# Patient Record
Sex: FEMALE | Race: WHITE | NOT HISPANIC OR LATINO | Employment: OTHER | URBAN - METROPOLITAN AREA
[De-identification: names, ages, dates, MRNs, and addresses within clinical notes are randomized per-mention and may not be internally consistent; named-entity substitution may affect disease eponyms.]

---

## 2023-09-06 LAB
ALANINE AMINOTRANSFERASE (SGPT) (U/L) IN SER/PLAS: 12 U/L (ref 7–45)
ALBUMIN (G/DL) IN SER/PLAS: 4.1 G/DL (ref 3.4–5)
ALKALINE PHOSPHATASE (U/L) IN SER/PLAS: 38 U/L (ref 33–136)
ANION GAP IN SER/PLAS: 13 MMOL/L (ref 10–20)
APPEARANCE, URINE: NORMAL
ASPARTATE AMINOTRANSFERASE (SGOT) (U/L) IN SER/PLAS: 21 U/L (ref 9–39)
BASOPHILS (10*3/UL) IN BLOOD BY AUTOMATED COUNT: 0.08 X10E9/L (ref 0–0.1)
BASOPHILS/100 LEUKOCYTES IN BLOOD BY AUTOMATED COUNT: 1.8 % (ref 0–2)
BILIRUBIN TOTAL (MG/DL) IN SER/PLAS: 0.6 MG/DL (ref 0–1.2)
BILIRUBIN, URINE: NEGATIVE
BLOOD, URINE: NEGATIVE
CALCIDIOL (25 OH VITAMIN D3) (NG/ML) IN SER/PLAS: 28 NG/ML
CALCIUM (MG/DL) IN SER/PLAS: 9 MG/DL (ref 8.6–10.3)
CARBON DIOXIDE, TOTAL (MMOL/L) IN SER/PLAS: 29 MMOL/L (ref 21–32)
CHLORIDE (MMOL/L) IN SER/PLAS: 90 MMOL/L (ref 98–107)
CHOLESTEROL (MG/DL) IN SER/PLAS: 158 MG/DL (ref 0–199)
CHOLESTEROL IN HDL (MG/DL) IN SER/PLAS: 90.2 MG/DL
CHOLESTEROL/HDL RATIO: 1.8
COLOR, URINE: YELLOW
CREATININE (MG/DL) IN SER/PLAS: 0.73 MG/DL (ref 0.5–1.05)
EOSINOPHILS (10*3/UL) IN BLOOD BY AUTOMATED COUNT: 0.08 X10E9/L (ref 0–0.4)
EOSINOPHILS/100 LEUKOCYTES IN BLOOD BY AUTOMATED COUNT: 1.8 % (ref 0–6)
ERYTHROCYTE DISTRIBUTION WIDTH (RATIO) BY AUTOMATED COUNT: 13 % (ref 11.5–14.5)
ERYTHROCYTE MEAN CORPUSCULAR HEMOGLOBIN CONCENTRATION (G/DL) BY AUTOMATED: 32.2 G/DL (ref 32–36)
ERYTHROCYTE MEAN CORPUSCULAR VOLUME (FL) BY AUTOMATED COUNT: 95 FL (ref 80–100)
ERYTHROCYTES (10*6/UL) IN BLOOD BY AUTOMATED COUNT: 3.76 X10E12/L (ref 4–5.2)
GFR FEMALE: 81 ML/MIN/1.73M2
GLUCOSE (MG/DL) IN SER/PLAS: 84 MG/DL (ref 74–99)
GLUCOSE, URINE: NEGATIVE MG/DL
HEMATOCRIT (%) IN BLOOD BY AUTOMATED COUNT: 35.7 % (ref 36–46)
HEMOGLOBIN (G/DL) IN BLOOD: 11.5 G/DL (ref 12–16)
IMMATURE GRANULOCYTES/100 LEUKOCYTES IN BLOOD BY AUTOMATED COUNT: 0.2 % (ref 0–0.9)
KETONES, URINE: NEGATIVE MG/DL
LDL: 58 MG/DL (ref 0–99)
LEUKOCYTE ESTERASE, URINE: NEGATIVE
LEUKOCYTES (10*3/UL) IN BLOOD BY AUTOMATED COUNT: 4.4 X10E9/L (ref 4.4–11.3)
LYMPHOCYTES (10*3/UL) IN BLOOD BY AUTOMATED COUNT: 0.89 X10E9/L (ref 0.8–3)
LYMPHOCYTES/100 LEUKOCYTES IN BLOOD BY AUTOMATED COUNT: 20.2 % (ref 13–44)
MONOCYTES (10*3/UL) IN BLOOD BY AUTOMATED COUNT: 0.41 X10E9/L (ref 0.05–0.8)
MONOCYTES/100 LEUKOCYTES IN BLOOD BY AUTOMATED COUNT: 9.3 % (ref 2–10)
NEUTROPHILS (10*3/UL) IN BLOOD BY AUTOMATED COUNT: 2.94 X10E9/L (ref 1.6–5.5)
NEUTROPHILS/100 LEUKOCYTES IN BLOOD BY AUTOMATED COUNT: 66.7 % (ref 40–80)
NITRITE, URINE: NEGATIVE
PH, URINE: 5 (ref 5–8)
PLATELETS (10*3/UL) IN BLOOD AUTOMATED COUNT: 268 X10E9/L (ref 150–450)
POTASSIUM (MMOL/L) IN SER/PLAS: 4 MMOL/L (ref 3.5–5.3)
PROTEIN TOTAL: 6.3 G/DL (ref 6.4–8.2)
PROTEIN, URINE: NEGATIVE MG/DL
SODIUM (MMOL/L) IN SER/PLAS: 128 MMOL/L (ref 136–145)
SPECIFIC GRAVITY, URINE: 1.01 (ref 1–1.03)
THYROTROPIN (MIU/L) IN SER/PLAS BY DETECTION LIMIT <= 0.05 MIU/L: 2.24 MIU/L (ref 0.44–3.98)
TRIGLYCERIDE (MG/DL) IN SER/PLAS: 48 MG/DL (ref 0–149)
UREA NITROGEN (MG/DL) IN SER/PLAS: 16 MG/DL (ref 6–23)
UROBILINOGEN, URINE: <2 MG/DL (ref 0–1.9)
VLDL: 10 MG/DL (ref 0–40)

## 2023-10-31 PROBLEM — R00.1 BRADYCARDIA: Status: ACTIVE | Noted: 2023-10-31

## 2023-10-31 PROBLEM — Z85.3 PERSONAL HISTORY OF MALIGNANT NEOPLASM OF BREAST: Status: ACTIVE | Noted: 2022-02-14

## 2023-10-31 PROBLEM — I10 ESSENTIAL HYPERTENSION: Status: ACTIVE | Noted: 2022-02-14

## 2023-10-31 PROBLEM — K64.8 INTERNAL HEMORRHOIDS: Status: ACTIVE | Noted: 2023-10-31

## 2023-10-31 PROBLEM — H44.519 ABSOLUTE GLAUCOMA: Status: ACTIVE | Noted: 2022-02-14

## 2023-10-31 PROBLEM — I27.29 OTHER SECONDARY PULMONARY HYPERTENSION (MULTI): Status: ACTIVE | Noted: 2023-10-31

## 2023-10-31 PROBLEM — I35.0 NONRHEUMATIC AORTIC (VALVE) STENOSIS: Status: ACTIVE | Noted: 2023-10-31

## 2023-10-31 PROBLEM — K59.09 CONSTIPATION, CHRONIC: Status: ACTIVE | Noted: 2023-10-31

## 2023-10-31 PROBLEM — K92.1 HEMATOCHEZIA: Status: ACTIVE | Noted: 2023-10-31

## 2023-10-31 PROBLEM — J20.9 BRONCHITIS, ACUTE: Status: ACTIVE | Noted: 2023-10-31

## 2023-10-31 PROBLEM — I48.19 PERSISTENT ATRIAL FIBRILLATION (MULTI): Status: ACTIVE | Noted: 2019-10-30

## 2023-10-31 PROBLEM — K92.1 MELENA: Status: ACTIVE | Noted: 2022-02-14

## 2023-10-31 PROBLEM — I35.9 AORTIC VALVE DISORDER: Status: ACTIVE | Noted: 2022-02-14

## 2023-10-31 PROBLEM — R60.9 EDEMA: Status: ACTIVE | Noted: 2023-10-31

## 2023-10-31 PROBLEM — N39.0 URINARY TRACT INFECTIOUS DISEASE: Status: ACTIVE | Noted: 2022-02-14

## 2023-10-31 PROBLEM — K92.1 BLOOD IN STOOL: Status: ACTIVE | Noted: 2023-10-31

## 2023-10-31 PROBLEM — Z95.2 S/P TAVR (TRANSCATHETER AORTIC VALVE REPLACEMENT): Status: ACTIVE | Noted: 2023-10-31

## 2023-10-31 RX ORDER — AMOXICILLIN AND CLAVULANATE POTASSIUM 875; 125 MG/1; MG/1
875 TABLET, FILM COATED ORAL 2 TIMES DAILY
COMMUNITY
Start: 2022-04-04 | End: 2024-05-28 | Stop reason: SDUPTHER

## 2023-10-31 RX ORDER — MULTIVIT-MIN/IRON/FOLIC ACID/K 18-600-40
500 CAPSULE ORAL DAILY
COMMUNITY
Start: 2021-07-19

## 2023-10-31 RX ORDER — UBIDECARENONE 75 MG
CAPSULE ORAL
COMMUNITY
Start: 2021-07-19

## 2023-10-31 RX ORDER — TAMOXIFEN CITRATE 20 MG/1
TABLET ORAL
COMMUNITY
Start: 2021-07-19

## 2023-10-31 RX ORDER — HYDROCORTISONE ACETATE 25 MG/1
SUPPOSITORY RECTAL
COMMUNITY
Start: 2023-06-15

## 2023-10-31 RX ORDER — GLUCOSAM/CHONDRO/HERB 149/HYAL 750-100 MG
TABLET ORAL
COMMUNITY

## 2023-10-31 RX ORDER — GLUCOSAMINE HCL 500 MG
1 TABLET ORAL DAILY
COMMUNITY

## 2023-10-31 RX ORDER — ERGOCALCIFEROL 1.25 MG/1
CAPSULE ORAL
COMMUNITY

## 2023-10-31 RX ORDER — DOXYCYCLINE 100 MG/1
CAPSULE ORAL
COMMUNITY
Start: 2023-05-23 | End: 2024-06-03 | Stop reason: SDUPTHER

## 2023-10-31 RX ORDER — ACETAMINOPHEN 500 MG
TABLET ORAL
COMMUNITY

## 2023-10-31 RX ORDER — BENZONATATE 200 MG/1
200 CAPSULE ORAL 3 TIMES DAILY PRN
COMMUNITY
Start: 2022-04-04

## 2023-10-31 RX ORDER — VITAMIN E MIXED 400 UNIT
CAPSULE ORAL
COMMUNITY
Start: 2011-02-16

## 2023-10-31 RX ORDER — VITAMIN E 268 MG
CAPSULE ORAL
COMMUNITY
Start: 2021-07-19

## 2023-10-31 RX ORDER — LATANOPROST 50 UG/ML
SOLUTION/ DROPS OPHTHALMIC
COMMUNITY
Start: 2021-07-19

## 2023-10-31 RX ORDER — FUROSEMIDE 20 MG/1
TABLET ORAL
COMMUNITY
Start: 2023-08-24

## 2023-10-31 RX ORDER — POTASSIUM CHLORIDE 1500 MG/1
TABLET, EXTENDED RELEASE ORAL
COMMUNITY
Start: 2023-08-24

## 2023-10-31 RX ORDER — ALBUTEROL SULFATE 90 UG/1
AEROSOL, METERED RESPIRATORY (INHALATION)
COMMUNITY
Start: 2021-07-19

## 2023-10-31 RX ORDER — FUROSEMIDE 40 MG/1
1 TABLET ORAL DAILY
COMMUNITY
Start: 2023-09-14 | End: 2024-09-12

## 2023-10-31 RX ORDER — ASPIRIN 81 MG/1
81 TABLET ORAL DAILY
COMMUNITY

## 2023-10-31 RX ORDER — ALBUTEROL SULFATE 90 UG/1
AEROSOL, METERED RESPIRATORY (INHALATION)
COMMUNITY
Start: 2022-10-21

## 2023-10-31 RX ORDER — METOLAZONE 2.5 MG/1
2.5 TABLET ORAL
COMMUNITY
Start: 2023-08-24

## 2023-10-31 RX ORDER — QUININE SULFATE 324 MG/1
324 CAPSULE ORAL NIGHTLY
COMMUNITY
Start: 2019-04-22

## 2023-11-07 DIAGNOSIS — C50.912 MALIGNANT NEOPLASM OF LEFT FEMALE BREAST, UNSPECIFIED ESTROGEN RECEPTOR STATUS, UNSPECIFIED SITE OF BREAST (MULTI): ICD-10-CM

## 2023-11-14 ENCOUNTER — OFFICE VISIT (OUTPATIENT)
Dept: HEMATOLOGY/ONCOLOGY | Facility: CLINIC | Age: 83
End: 2023-11-14
Payer: MEDICARE

## 2023-11-14 ENCOUNTER — APPOINTMENT (OUTPATIENT)
Dept: LAB | Facility: HOSPITAL | Age: 83
End: 2023-11-14
Payer: MEDICARE

## 2023-11-14 VITALS
WEIGHT: 159.28 LBS | DIASTOLIC BLOOD PRESSURE: 54 MMHG | OXYGEN SATURATION: 95 % | HEIGHT: 63 IN | HEART RATE: 119 BPM | BODY MASS INDEX: 28.22 KG/M2 | RESPIRATION RATE: 16 BRPM | SYSTOLIC BLOOD PRESSURE: 116 MMHG | TEMPERATURE: 97.9 F

## 2023-11-14 DIAGNOSIS — C50.912 MALIGNANT NEOPLASM OF LEFT FEMALE BREAST, UNSPECIFIED ESTROGEN RECEPTOR STATUS, UNSPECIFIED SITE OF BREAST (MULTI): ICD-10-CM

## 2023-11-14 LAB
ALBUMIN SERPL BCP-MCNC: 4.1 G/DL (ref 3.4–5)
ALP SERPL-CCNC: 41 U/L (ref 33–136)
ALT SERPL W P-5'-P-CCNC: 12 U/L (ref 7–45)
ANION GAP SERPL CALC-SCNC: 11 MMOL/L (ref 10–20)
AST SERPL W P-5'-P-CCNC: 21 U/L (ref 9–39)
BASOPHILS # BLD AUTO: 0.08 X10*3/UL (ref 0–0.1)
BASOPHILS NFR BLD AUTO: 1.3 %
BILIRUB SERPL-MCNC: 0.5 MG/DL (ref 0–1.2)
BUN SERPL-MCNC: 27 MG/DL (ref 6–23)
CALCIUM SERPL-MCNC: 9 MG/DL (ref 8.6–10.3)
CHLORIDE SERPL-SCNC: 100 MMOL/L (ref 98–107)
CO2 SERPL-SCNC: 30 MMOL/L (ref 21–32)
CREAT SERPL-MCNC: 0.98 MG/DL (ref 0.5–1.05)
EOSINOPHIL # BLD AUTO: 0.08 X10*3/UL (ref 0–0.4)
EOSINOPHIL NFR BLD AUTO: 1.3 %
ERYTHROCYTE [DISTWIDTH] IN BLOOD BY AUTOMATED COUNT: 14.2 % (ref 11.5–14.5)
GFR SERPL CREATININE-BSD FRML MDRD: 57 ML/MIN/1.73M*2
GLUCOSE SERPL-MCNC: 92 MG/DL (ref 74–99)
HCT VFR BLD AUTO: 35.1 % (ref 36–46)
HGB BLD-MCNC: 11.4 G/DL (ref 12–16)
IMM GRANULOCYTES # BLD AUTO: 0.01 X10*3/UL (ref 0–0.5)
IMM GRANULOCYTES NFR BLD AUTO: 0.2 % (ref 0–0.9)
LYMPHOCYTES # BLD AUTO: 0.82 X10*3/UL (ref 0.8–3)
LYMPHOCYTES NFR BLD AUTO: 13.5 %
MCH RBC QN AUTO: 30.7 PG (ref 26–34)
MCHC RBC AUTO-ENTMCNC: 32.5 G/DL (ref 32–36)
MCV RBC AUTO: 95 FL (ref 80–100)
MONOCYTES # BLD AUTO: 0.57 X10*3/UL (ref 0.05–0.8)
MONOCYTES NFR BLD AUTO: 9.4 %
NEUTROPHILS # BLD AUTO: 4.5 X10*3/UL (ref 1.6–5.5)
NEUTROPHILS NFR BLD AUTO: 74.3 %
PLATELET # BLD AUTO: 266 X10*3/UL (ref 150–450)
POTASSIUM SERPL-SCNC: 3.9 MMOL/L (ref 3.5–5.3)
PROT SERPL-MCNC: 7 G/DL (ref 6.4–8.2)
RBC # BLD AUTO: 3.71 X10*6/UL (ref 4–5.2)
SODIUM SERPL-SCNC: 137 MMOL/L (ref 136–145)
WBC # BLD AUTO: 6.1 X10*3/UL (ref 4.4–11.3)

## 2023-11-14 PROCEDURE — 3074F SYST BP LT 130 MM HG: CPT | Performed by: INTERNAL MEDICINE

## 2023-11-14 PROCEDURE — 80053 COMPREHEN METABOLIC PANEL: CPT | Performed by: INTERNAL MEDICINE

## 2023-11-14 PROCEDURE — 1125F AMNT PAIN NOTED PAIN PRSNT: CPT | Performed by: INTERNAL MEDICINE

## 2023-11-14 PROCEDURE — 85025 COMPLETE CBC W/AUTO DIFF WBC: CPT | Performed by: INTERNAL MEDICINE

## 2023-11-14 PROCEDURE — 99214 OFFICE O/P EST MOD 30 MIN: CPT | Performed by: INTERNAL MEDICINE

## 2023-11-14 PROCEDURE — 36415 COLL VENOUS BLD VENIPUNCTURE: CPT | Performed by: INTERNAL MEDICINE

## 2023-11-14 PROCEDURE — 1159F MED LIST DOCD IN RCRD: CPT | Performed by: INTERNAL MEDICINE

## 2023-11-14 PROCEDURE — 3078F DIAST BP <80 MM HG: CPT | Performed by: INTERNAL MEDICINE

## 2023-11-14 ASSESSMENT — PAIN SCALES - GENERAL: PAINLEVEL: 5

## 2023-11-14 ASSESSMENT — ENCOUNTER SYMPTOMS
LOSS OF SENSATION IN FEET: 0
OCCASIONAL FEELINGS OF UNSTEADINESS: 1
DEPRESSION: 0

## 2023-11-14 ASSESSMENT — PATIENT HEALTH QUESTIONNAIRE - PHQ9
10. IF YOU CHECKED OFF ANY PROBLEMS, HOW DIFFICULT HAVE THESE PROBLEMS MADE IT FOR YOU TO DO YOUR WORK, TAKE CARE OF THINGS AT HOME, OR GET ALONG WITH OTHER PEOPLE: NOT DIFFICULT AT ALL
SUM OF ALL RESPONSES TO PHQ9 QUESTIONS 1 AND 2: 1
1. LITTLE INTEREST OR PLEASURE IN DOING THINGS: NOT AT ALL
2. FEELING DOWN, DEPRESSED OR HOPELESS: SEVERAL DAYS

## 2023-11-14 ASSESSMENT — COLUMBIA-SUICIDE SEVERITY RATING SCALE - C-SSRS
6. HAVE YOU EVER DONE ANYTHING, STARTED TO DO ANYTHING, OR PREPARED TO DO ANYTHING TO END YOUR LIFE?: NO
2. HAVE YOU ACTUALLY HAD ANY THOUGHTS OF KILLING YOURSELF?: NO
1. IN THE PAST MONTH, HAVE YOU WISHED YOU WERE DEAD OR WISHED YOU COULD GO TO SLEEP AND NOT WAKE UP?: NO

## 2023-11-14 NOTE — PATIENT INSTRUCTIONS
Continue tamoxifen.     Follow up in 1 year with Dr. Saucedo.     Lab appointments are no longer scheduled. Please arrive at least 15 minutes before scheduled appointment time for blood work.

## 2023-11-14 NOTE — PROGRESS NOTES
Patient Visit Information:   Visit Type: Follow Up Visit      Cancer History:          Breast         AJCC Edition: 7th (AJCC), Diagnosis Date: 03-Feb-1988, IIIC, T2 pN3 M0      Treatment Synopsis:    Carcinoma breast diagnosed in 1988.  Had left modified radical mastectomy.  Chemotherapy with Cytoxan and Adriamycin and 5-FU ×6 courses  Hormonal treatment with tamoxifen.        History of Present Illness:      ID Statement:    VAIBHAV DOTSON is a 82 year old Female        Chief Complaint: Office visit for follow-up and treatment  of breast cancer.   Interval History:    This is 81 years old lady who has the carcinoma breast diagnosed in 1988.  She had left modified radical mastectomy.  Seventeen out of 27 nodes were positive for  metastatic disease.  She had adjuvant chemotherapy with Cytoxan, Adriamycin and 5-FU ×6 courses.  Estrogen receptors were her positive and progesterone receptor were positive.  She has been on hormonal treatment with tamoxifen for last many years.   Patient was advised that she does not need to take it for that length of time but she wanted to continue it.       Patient   has aortic stenosis,s/p aortic valve replacement.     11/14/23    Patient has a history of left breast cancer status postmastectomy adjuvant chemotherapy including CAF in remission since 1988      She comes for follow-up visit, no any new complaint, patient is taking her Eliquis because of aortic valve replacement.  Patient is still active           Review of Systems:   Review of Systems:    Constitutional: Feeling well well, no fatigue or weakness     Head and neck: No headaches or dizziness.        HEENT: No sore throat or sinusitis.  Hearing is normal eyesight is good.     Cardiac: No chest pain or palpitations     Pulmonary no cough or shortness of breath.     GI: Appetite is good and weight stable.  No constipation or diarrhea.  No abdominal pain     Genitourinary: No frequency or urgency.  No polyuria or dysuria.      Musculocutaneous: No arthritis.     Endocrine: No diabetes no thyroid disease.  Skin: She had herpes zoster in the left chest wall and axilla.  Those lesions have healed.     Skin: No rash or itching.     Neuromuscular no fainting or dizziness.  No history of convulsions.  No tingling or numbness        Allergies and Intolerances:       Allergies:         codeine: Drug, Unknown, Active         Cipro: Drug, Unknown, Active         Demerol HCl: Drug, Unknown, Active     Outpatient Medication Profile:  * Patient Currently Takes Medications as of 14-Jun-2022 13:30 documented in Structured Notes         Mastectomy bras X 4 and prosthesis X 1 : DX: breast ca, Start Date: 14-Jun-2022         Aspirin Enteric Coated 81 mg oral delayed release tablet: Last Dose Taken:   , 1 tab(s) orally once a day , Start Date: 08-Sep-2021         tamoxifen 20 mg oral tablet: Last Dose Taken:  , 1 tab(s) orally once  a day, Start Date: 15-Ace-2021         quiNINE 324 mg oral capsule: Last Dose Taken:  , 1 cap(s)  2 times a day  , Start Date: 22-Apr-2019         Eliquis 5 mg oral tablet: Last Dose Taken:  , 1 tab(s) orally 2 times  a day         albuterol 90 mcg/inh inhalation aerosol: Last Dose Taken:  , 2 puff(s)  inhaled every 6 hours         latanoprost 0.005% ophthalmic solution: Last Dose Taken:  , 1 drop(s)  to each affected eye once a day (in the evening)         Vitamin D3: Last Dose Taken:  , orally once a day         Vitamin C: Last Dose Taken:  , 500 milligram(s) orally once a day         vitamin E: Last Dose Taken:  , 400 unit(s) orally once a day         B-12: Last Dose Taken:  , 2500 microgram(s) orally once a day         Fish Oil oral capsule: Last Dose Taken:  , 1000 milligram(s) orally         triamterene-hydrochlorothiazide 37.5 mg-25 mg oral capsule: Last Dose  Taken:  , 1 cap(s) orally once a day         glucosamine 500 mg oral tablet: Last Dose Taken:  , 1 tab(s) orally once  a day         Vitamin B-12 500 mcg oral  tablet: Last Dose Taken:  , 1 tab(s) orally  once a day             Medical History:         Aortic valve stenosis: ICD-10: I35.0, Status: Active         Hypertension: ICD-10: I10, Status: Active         Malignant neoplasm of breast: ICD-10: C50.919, Status: Active       Surg History:         S/P TAVR (transcatheter aortic valve replacement): ICD-10:  Z95.2, Status: Active         History of modified radical mastectomy of left breast: ICD-10:  Z90.12, Status: Active     Family History: Family history of malignant neoplasm  of breast (Sister Age Unknown)  Family history of malignant neoplasm of breast (Maternal Aunt Age Unknown)      Social History:   Social Substance History:  ·  Smoking Status never smoker   ·  Tobacco Use denies   ·  Alcohol Use occasionally   ·  Drug Use denies   ·  Additional History     Social history: She is retired.  She is a nonsmoker.(1)           Vitals and Measurements:   Vitals: Temp: 36.5  HR: 64  RR: 16  BP: 155/66  SPO2%:   98   Measurements: HT(cm): 161.7  WT(kg): 79.6  BSA: 1.89   BMI:  30.4   Last 3 Weights & Heights: Date:                           Weight/Scale Type:                    Height:   14-Jun-2022 12:56                79.6  kg                     161.7  cm  15-Ace-2021 09:18                82.1  kg / standing scale                     161.7  cm      Physical Exam:      Constitutional: awake/alert/oriented x3, no distress,   Eyes: PERRL, sclera is clear.   ENMT: mucous membranes moist, no apparent injury,  no lesions seen   Head/Neck: Neck supple,   thyroid without mass or  tenderness, No JVD, trachea midline, no bruits   Respiratory/Thorax: Thorax: Left modified radical  mastectomy.  Lower rib cage area is prominent but no tenderness.  There is no distinct mass palpable.   Cardiovascular: Heart is irregular.  Grade 3/6 systolic  murmur.   Gastrointestinal: Nondistended, soft, non-tender,  no rebound tenderness or guarding, no masses palpable, no organomegaly, +BS,    Genitourinary: No CVA tenderness.   Musculoskeletal: ROM intact, no joint swelling, normal  strength   Extremities: normal extremities, no cyanosis edema,  no clubbing   Neurological: alert and oriented x3, intact senses,  motor, response and reflexes, normal strength   Breast: Left modified radical mastectomy.  Right  breast no mass or tenderness   Lymphatic: No significant lymphadenopathy   Psychological: Appropriate mood and behavior   Skin: Warm and dry, no lesions, no rashes         Lab Results:       74 - 99 mg/dL 92 84 76 74 104 High  93 81   Sodium  136 - 145 mmol/L 137 128 Low  140 137 139 139 136   Potassium  3.5 - 5.3 mmol/L 3.9 4.0 3.7 4.5 3.7 3.6 3.8   Chloride  98 - 107 mmol/L 100 90 Low  103 104 104 104 101   Bicarbonate  21 - 32 mmol/L 30 29 29 28 26 28 28   Anion Gap  10 - 20 mmol/L 11 13 12 10 13 11 11   Urea Nitrogen  6 - 23 mg/dL 27 High  16 17 14 14 18 14   Creatinine  0.50 - 1.05 mg/dL 0.98 0.73 0.81 0.72 0.70 0.75 0.79   eGFR  >60 mL/min/1.73m*2 57 Low          Comment: Calculations of estimated GFR are performed using the 2021 CKD-EPI Study Refit equation without the race variable for the IDMS-Traceable creatinine methods.  https://jasn.asnjournals.org/content/early/2021/09/22/ASN.9024481614   Calcium  8.6 - 10.3 mg/dL 9.0 9.0 8.8 9.1 8.2 Low  R 8.2 Low  R 8.8   Albumin  3.4 - 5.0 g/dL 4.1 4.1 3.8  3.3 Low      Alkaline Phosphatase  33 - 136 U/L 41 38 39       Total Protein  6.4 - 8.2 g/dL 7.0 6.3 Low  6.5       AST  9 - 39 U/L 21              4.4 - 11.3 x10*3/uL 6.1 4.4 R 6.6 R 6.2 R 9.1 R 5.4 R 6.7 R   RBC  4.00 - 5.20 x10*6/uL 3.71 Low  3.76 Low  R 3.72 Low  R 3.52 Low  R 3.48 Low  R 3.23 Low  R 4.13 R   Hemoglobin  12.0 - 16.0 g/dL 11.4 Low  11.5 Low  11.7 Low  11.1 Low  11.2 Low  10.4 Low  12.9   Hematocrit  36.0 - 46.0 % 35.1 Low  35.7 Low  36.5 35.2 Low  34.7 Low  31.5 Low  40.2   MCV  80 - 100 fL 95 95 98 100 100 98 97   MCH  26.0 - 34.0 pg 30.7         MCHC  32.0 - 36.0 g/dL 32.5  32.2 32.1 31.5 Low  32.3 33.0 32.1   RDW  11.5 - 14.5 % 14.2 13.0 13.9 13.3 13.4 13.2 13.0   Platelets  150 - 450 x10*3/uL 266           ·  Results            Assessment and Plan:      Assessment and Plan:   Assessment:          #1 carcinoma left breast , post left modified radical mastectomy.  Patient also received adjuvant chemotherapy including CAF, has been remission since 1988     #2 hypertension.     #3 muscle cramps.      4.  Hemorrhoid and rectal bleed        11/14/23     Clinically patient I will reevaluate after 1 year.  Patient does not want to stop her tamoxifen.     Patient history of hemorrhoids and sometimes has rectal bleed I will make arrangement for surgical evaluation patient is in agreement      Patient is slightly anemic continue monitor CBC      Follow-up after 1 year       Time spent: 30 minutes.

## 2023-12-07 DIAGNOSIS — J45.909 MILD ASTHMA, UNSPECIFIED WHETHER COMPLICATED, UNSPECIFIED WHETHER PERSISTENT (HHS-HCC): Primary | ICD-10-CM

## 2023-12-07 RX ORDER — ALBUTEROL SULFATE 90 UG/1
AEROSOL, METERED RESPIRATORY (INHALATION)
Qty: 6.7 G | Refills: 5 | Status: SHIPPED | OUTPATIENT
Start: 2023-12-07

## 2024-05-15 ENCOUNTER — HOSPITAL ENCOUNTER (OUTPATIENT)
Dept: RADIOLOGY | Facility: CLINIC | Age: 84
Discharge: HOME | End: 2024-05-15
Payer: MEDICARE

## 2024-05-15 DIAGNOSIS — M81.0 AGE-RELATED OSTEOPOROSIS WITHOUT CURRENT PATHOLOGICAL FRACTURE: ICD-10-CM

## 2024-05-15 PROCEDURE — 77080 DXA BONE DENSITY AXIAL: CPT | Performed by: RADIOLOGY

## 2024-05-15 PROCEDURE — 77080 DXA BONE DENSITY AXIAL: CPT

## 2024-05-28 ENCOUNTER — OFFICE VISIT (OUTPATIENT)
Dept: PRIMARY CARE | Facility: CLINIC | Age: 84
End: 2024-05-28
Payer: MEDICARE

## 2024-05-28 ENCOUNTER — TELEPHONE (OUTPATIENT)
Dept: PRIMARY CARE | Facility: CLINIC | Age: 84
End: 2024-05-28

## 2024-05-28 VITALS
DIASTOLIC BLOOD PRESSURE: 70 MMHG | WEIGHT: 159 LBS | BODY MASS INDEX: 29.26 KG/M2 | SYSTOLIC BLOOD PRESSURE: 120 MMHG | HEART RATE: 67 BPM | OXYGEN SATURATION: 96 % | HEIGHT: 62 IN

## 2024-05-28 DIAGNOSIS — J06.9 UPPER RESPIRATORY TRACT INFECTION, UNSPECIFIED TYPE: Primary | ICD-10-CM

## 2024-05-28 DIAGNOSIS — J06.9 UPPER RESPIRATORY TRACT INFECTION, UNSPECIFIED TYPE: ICD-10-CM

## 2024-05-28 PROCEDURE — 99213 OFFICE O/P EST LOW 20 MIN: CPT | Performed by: INTERNAL MEDICINE

## 2024-05-28 PROCEDURE — 3074F SYST BP LT 130 MM HG: CPT | Performed by: INTERNAL MEDICINE

## 2024-05-28 PROCEDURE — 3078F DIAST BP <80 MM HG: CPT | Performed by: INTERNAL MEDICINE

## 2024-05-28 PROCEDURE — 1159F MED LIST DOCD IN RCRD: CPT | Performed by: INTERNAL MEDICINE

## 2024-05-28 RX ORDER — AMOXICILLIN AND CLAVULANATE POTASSIUM 875; 125 MG/1; MG/1
875 TABLET, FILM COATED ORAL 2 TIMES DAILY
Qty: 14 TABLET | Refills: 0 | Status: SHIPPED | OUTPATIENT
Start: 2024-05-28 | End: 2024-05-28 | Stop reason: SDUPTHER

## 2024-05-28 RX ORDER — AMOXICILLIN AND CLAVULANATE POTASSIUM 875; 125 MG/1; MG/1
875 TABLET, FILM COATED ORAL 2 TIMES DAILY
Qty: 14 TABLET | Refills: 0 | Status: SHIPPED | OUTPATIENT
Start: 2024-05-28 | End: 2024-06-04

## 2024-06-03 ENCOUNTER — TELEPHONE (OUTPATIENT)
Dept: PRIMARY CARE | Facility: CLINIC | Age: 84
End: 2024-06-03
Payer: MEDICARE

## 2024-06-03 RX ORDER — DOXYCYCLINE 100 MG/1
CAPSULE ORAL
Qty: 14 CAPSULE | Refills: 0 | Status: SHIPPED | OUTPATIENT
Start: 2024-06-03

## 2024-06-03 NOTE — TELEPHONE ENCOUNTER
Doxy 100 mg bid # [phone] , script sent to [facility]  If problem persists, robert go to [facility] or  see your PCP

## 2024-07-08 ENCOUNTER — TELEPHONE (OUTPATIENT)
Dept: HEMATOLOGY/ONCOLOGY | Facility: CLINIC | Age: 84
End: 2024-07-08
Payer: MEDICARE

## 2024-07-08 DIAGNOSIS — C50.912 MALIGNANT NEOPLASM OF LEFT BREAST IN FEMALE, ESTROGEN RECEPTOR POSITIVE, UNSPECIFIED SITE OF BREAST (MULTI): Primary | ICD-10-CM

## 2024-07-08 DIAGNOSIS — Z17.0 MALIGNANT NEOPLASM OF LEFT BREAST IN FEMALE, ESTROGEN RECEPTOR POSITIVE, UNSPECIFIED SITE OF BREAST (MULTI): Primary | ICD-10-CM

## 2024-07-08 RX ORDER — TAMOXIFEN CITRATE 20 MG/1
TABLET ORAL
Qty: 90 TABLET | Refills: 3 | Status: SHIPPED | OUTPATIENT
Start: 2024-07-08

## 2024-07-29 DIAGNOSIS — Z95.2 S/P TAVR (TRANSCATHETER AORTIC VALVE REPLACEMENT): Primary | ICD-10-CM

## 2024-07-29 RX ORDER — FUROSEMIDE 40 MG/1
40 TABLET ORAL DAILY
Qty: 90 TABLET | Refills: 0 | Status: SHIPPED | OUTPATIENT
Start: 2024-07-29

## 2024-08-26 NOTE — PROGRESS NOTES
Subjective   Aicha Ochoa is a 84 y.o. female who presents for evaluation of symptoms of a URI, and bronchitis . Symptoms include congestion, no  fever, productive cough with  white and yellow colored sputum, and sore throat. Onset of symptoms was 3 days ago and has been unchanged since that time. Treatment to date: cough suppressants and decongestants.  She had negative Covid test and had Covid booster  Objective   Physical Exam  Constitutional:       Appearance: Normal appearance.   HENT:      Nose: Congestion present.      Mouth/Throat:      Mouth: Mucous membranes are moist.      Pharynx: Oropharynx is clear. Posterior oropharyngeal erythema present. No oropharyngeal exudate.   Eyes:      Conjunctiva/sclera: Conjunctivae normal.   Cardiovascular:      Rate and Rhythm: Normal rate and regular rhythm.      Heart sounds: Normal heart sounds.   Pulmonary:      Effort: Pulmonary effort is normal.      Breath sounds: Normal breath sounds. No wheezing.   Neurological:      Mental Status: She is alert.          Assessment/Plan   viral upper respiratory illness.    Discussed diagnosis and treatment of URI.  Suggested symptomatic OTC remedies.  Nasal saline spray for congestion.  Follow up as needed.  Decongestants, mucinex , saline gargles, vit C, analgesics  Contact precautions  Augmentin po bid # [phone]    Partners: Male     Birth control/protection: Surgical     Comment: spouse with Vasectomy   Other Topics Concern    Not on file   Social History Narrative    Not on file     Social Determinants of Health     Financial Resource Strain: Low Risk  (7/1/2024)    Overall Financial Resource Strain (CARDIA)     Difficulty of Paying Living Expenses: Not hard at all   Food Insecurity: No Food Insecurity (7/1/2024)    Hunger Vital Sign     Worried About Running Out of Food in the Last Year: Never true     Ran Out of Food in the Last Year: Never true   Transportation Needs: Unknown (7/1/2024)    PRAPARE - Transportation     Lack of Transportation (Medical): Not on file     Lack of Transportation (Non-Medical): No   Physical Activity: Not on file   Stress: Not on file   Social Connections: Not on file   Intimate Partner Violence: Not on file   Housing Stability: Unknown (7/1/2024)    Housing Stability Vital Sign     Unable to Pay for Housing in the Last Year: Not on file     Number of Places Lived in the Last Year: Not on file     Unstable Housing in the Last Year: No     Family History   Problem Relation Age of Onset    High Blood Pressure Mother     High Blood Pressure Father     Cancer Father         bladder    Diabetes Other     Heart Disease Other        Review of Systems   Constitutional: Negative.  Negative for activity change, appetite change, chills, diaphoresis, fatigue, fever and unexpected weight change.   HENT: Negative.     Eyes: Negative.    Respiratory: Negative.     Cardiovascular: Negative.    Gastrointestinal:  Negative for abdominal distention, abdominal pain, anal bleeding, blood in stool, constipation, diarrhea, nausea, rectal pain and vomiting.   Endocrine: Negative.    Genitourinary:  Negative for decreased urine volume, difficulty urinating, dyspareunia, dysuria, enuresis, flank pain, frequency, genital sores, hematuria, menstrual problem, pelvic pain, urgency, vaginal bleeding, vaginal discharge and  vaginal pain.   Musculoskeletal: Negative.    Skin: Negative.    Allergic/Immunologic: Negative.    Neurological: Negative.    Hematological: Negative.    Psychiatric/Behavioral: Negative.         Objective:     Physical Exam  Constitutional:       General: She is not in acute distress.     Appearance: She is well-developed. She is not diaphoretic.   HENT:      Head: Normocephalic and atraumatic.   Eyes:      Conjunctiva/sclera: Conjunctivae normal.   Cardiovascular:      Rate and Rhythm: Normal rate and regular rhythm.   Pulmonary:      Effort: Pulmonary effort is normal. No respiratory distress.   Musculoskeletal:         General: No tenderness or deformity. Normal range of motion.      Cervical back: Normal range of motion and neck supple.   Skin:     General: Skin is warm and dry.      Coloration: Skin is not pale.   Neurological:      Mental Status: She is alert and oriented to person, place, and time.      Motor: No abnormal muscle tone.      Coordination: Coordination normal.   Psychiatric:         Behavior: Behavior normal.         Thought Content: Thought content normal.         Judgment: Judgment normal.         Assessment:       Diagnosis Orders   1. Abnormal uterine bleeding (AUB)             :      Medications placedthis encounter:  No orders of the defined types were placed in this encounter.        Orders placedthis encounter:  No orders of the defined types were placed in this encounter.        Follow up:  Return for Annual.

## 2024-11-13 ENCOUNTER — OFFICE VISIT (OUTPATIENT)
Dept: HEMATOLOGY/ONCOLOGY | Facility: CLINIC | Age: 84
End: 2024-11-13
Payer: MEDICARE

## 2024-11-13 VITALS
HEART RATE: 49 BPM | OXYGEN SATURATION: 98 % | SYSTOLIC BLOOD PRESSURE: 173 MMHG | BODY MASS INDEX: 29.25 KG/M2 | TEMPERATURE: 97.2 F | WEIGHT: 158.95 LBS | HEIGHT: 62 IN | RESPIRATION RATE: 16 BRPM | DIASTOLIC BLOOD PRESSURE: 59 MMHG

## 2024-11-13 DIAGNOSIS — C50.912 MALIGNANT NEOPLASM OF LEFT FEMALE BREAST, UNSPECIFIED ESTROGEN RECEPTOR STATUS, UNSPECIFIED SITE OF BREAST: ICD-10-CM

## 2024-11-13 LAB
BASOPHILS # BLD AUTO: 0.05 X10*3/UL (ref 0–0.1)
BASOPHILS NFR BLD AUTO: 1.1 %
EOSINOPHIL # BLD AUTO: 0.06 X10*3/UL (ref 0–0.4)
EOSINOPHIL NFR BLD AUTO: 1.3 %
ERYTHROCYTE [DISTWIDTH] IN BLOOD BY AUTOMATED COUNT: 12.3 % (ref 11.5–14.5)
HCT VFR BLD AUTO: 36.1 % (ref 36–46)
HGB BLD-MCNC: 11.8 G/DL (ref 12–16)
IMM GRANULOCYTES # BLD AUTO: 0 X10*3/UL (ref 0–0.5)
IMM GRANULOCYTES NFR BLD AUTO: 0 % (ref 0–0.9)
LYMPHOCYTES # BLD AUTO: 0.79 X10*3/UL (ref 0.8–3)
LYMPHOCYTES NFR BLD AUTO: 17 %
MCH RBC QN AUTO: 31.1 PG (ref 26–34)
MCHC RBC AUTO-ENTMCNC: 32.7 G/DL (ref 32–36)
MCV RBC AUTO: 95 FL (ref 80–100)
MONOCYTES # BLD AUTO: 0.37 X10*3/UL (ref 0.05–0.8)
MONOCYTES NFR BLD AUTO: 8 %
NEUTROPHILS # BLD AUTO: 3.38 X10*3/UL (ref 1.6–5.5)
NEUTROPHILS NFR BLD AUTO: 72.6 %
PLATELET # BLD AUTO: 266 X10*3/UL (ref 150–450)
RBC # BLD AUTO: 3.8 X10*6/UL (ref 4–5.2)
WBC # BLD AUTO: 4.7 X10*3/UL (ref 4.4–11.3)

## 2024-11-13 PROCEDURE — 99213 OFFICE O/P EST LOW 20 MIN: CPT | Performed by: INTERNAL MEDICINE

## 2024-11-13 PROCEDURE — 3078F DIAST BP <80 MM HG: CPT | Performed by: INTERNAL MEDICINE

## 2024-11-13 PROCEDURE — 36415 COLL VENOUS BLD VENIPUNCTURE: CPT | Performed by: INTERNAL MEDICINE

## 2024-11-13 PROCEDURE — 3077F SYST BP >= 140 MM HG: CPT | Performed by: INTERNAL MEDICINE

## 2024-11-13 PROCEDURE — 1126F AMNT PAIN NOTED NONE PRSNT: CPT | Performed by: INTERNAL MEDICINE

## 2024-11-13 PROCEDURE — 1160F RVW MEDS BY RX/DR IN RCRD: CPT | Performed by: INTERNAL MEDICINE

## 2024-11-13 PROCEDURE — 1159F MED LIST DOCD IN RCRD: CPT | Performed by: INTERNAL MEDICINE

## 2024-11-13 PROCEDURE — 85025 COMPLETE CBC W/AUTO DIFF WBC: CPT | Performed by: INTERNAL MEDICINE

## 2024-11-13 ASSESSMENT — PAIN SCALES - GENERAL: PAINLEVEL_OUTOF10: 0-NO PAIN

## 2024-11-13 NOTE — PROGRESS NOTES
Patient Visit Information:   Visit Type: Follow Up Visit      Cancer History:          Breast         AJCC Edition: 7th (AJCC), Diagnosis Date: 03-Feb-1988, IIIC, T2 pN3 M0      Treatment Synopsis:    Carcinoma breast diagnosed in 1988.  Had left modified radical mastectomy.  Chemotherapy with Cytoxan and Adriamycin and 5-FU ×6 courses  Hormonal treatment with tamoxifen.        History of Present Illness:      ID Statement:    VAIBHAV DOTSON is a 82 year old Female        Chief Complaint: Office visit for follow-up and treatment  of breast cancer.   Interval History:    This is 81 years old lady who has the carcinoma breast diagnosed in 1988.  She had left modified radical mastectomy.  Seventeen out of 27 nodes were positive for  metastatic disease.  She had adjuvant chemotherapy with Cytoxan, Adriamycin and 5-FU ×6 courses.  Estrogen receptors were her positive and progesterone receptor were positive.  She has been on hormonal treatment with tamoxifen for last many years.   Patient was advised that she does not need to take it for that length of time but she wanted to continue it.       Patient   has aortic stenosis,s/p aortic valve replacement.     11/13/24    Patient has a history of left breast cancer status postmastectomy adjuvant chemotherapy including CAF in remission since 1988      She comes for follow-up visit, no any new complaint, patient is taking her Eliquis because of aortic valve replacement.  Patient is still active           Review of Systems:   Review of Systems:    Constitutional: Feeling well well, no fatigue or weakness     Head and neck: No headaches or dizziness.        HEENT: No sore throat or sinusitis.  Hearing is normal eyesight is good.     Cardiac: No chest pain or palpitations     Pulmonary no cough or shortness of breath.     GI: Appetite is good and weight stable.  No constipation or diarrhea.  No abdominal pain     Genitourinary: No frequency or urgency.  No polyuria or dysuria.      Musculocutaneous: No arthritis.     Endocrine: No diabetes no thyroid disease.  Skin: She had herpes zoster in the left chest wall and axilla.  Those lesions have healed.     Skin: No rash or itching.     Neuromuscular no fainting or dizziness.  No history of convulsions.  No tingling or numbness        Allergies and Intolerances:       Allergies:         codeine: Drug, Unknown, Active         Cipro: Drug, Unknown, Active         Demerol HCl: Drug, Unknown, Active     Outpatient Medication Profile:  * Patient Currently Takes Medications as of 14-Jun-2022 13:30 documented in Structured Notes         Mastectomy bras X 4 and prosthesis X 1 : DX: breast ca, Start Date: 14-Jun-2022         Aspirin Enteric Coated 81 mg oral delayed release tablet: Last Dose Taken:   , 1 tab(s) orally once a day , Start Date: 08-Sep-2021         tamoxifen 20 mg oral tablet: Last Dose Taken:  , 1 tab(s) orally once  a day, Start Date: 15-Ace-2021         quiNINE 324 mg oral capsule: Last Dose Taken:  , 1 cap(s)  2 times a day  , Start Date: 22-Apr-2019         Eliquis 5 mg oral tablet: Last Dose Taken:  , 1 tab(s) orally 2 times  a day         albuterol 90 mcg/inh inhalation aerosol: Last Dose Taken:  , 2 puff(s)  inhaled every 6 hours         latanoprost 0.005% ophthalmic solution: Last Dose Taken:  , 1 drop(s)  to each affected eye once a day (in the evening)         Vitamin D3: Last Dose Taken:  , orally once a day         Vitamin C: Last Dose Taken:  , 500 milligram(s) orally once a day         vitamin E: Last Dose Taken:  , 400 unit(s) orally once a day         B-12: Last Dose Taken:  , 2500 microgram(s) orally once a day         Fish Oil oral capsule: Last Dose Taken:  , 1000 milligram(s) orally         triamterene-hydrochlorothiazide 37.5 mg-25 mg oral capsule: Last Dose  Taken:  , 1 cap(s) orally once a day         glucosamine 500 mg oral tablet: Last Dose Taken:  , 1 tab(s) orally once  a day         Vitamin B-12 500 mcg oral  tablet: Last Dose Taken:  , 1 tab(s) orally  once a day             Medical History:         Aortic valve stenosis: ICD-10: I35.0, Status: Active         Hypertension: ICD-10: I10, Status: Active         Malignant neoplasm of breast: ICD-10: C50.919, Status: Active       Surg History:         S/P TAVR (transcatheter aortic valve replacement): ICD-10:  Z95.2, Status: Active         History of modified radical mastectomy of left breast: ICD-10:  Z90.12, Status: Active     Family History: Family history of malignant neoplasm  of breast (Sister Age Unknown)  Family history of malignant neoplasm of breast (Maternal Aunt Age Unknown)      Social History:   Social Substance History:  ·  Smoking Status never smoker   ·  Tobacco Use denies   ·  Alcohol Use occasionally   ·  Drug Use denies   ·  Additional History     Social history: She is retired.  She is a nonsmoker.(1)           Vitals and Measurements:   Vitals: Temp: 36.5  HR: 64  RR: 16  BP: 155/66  SPO2%:   98   Measurements: HT(cm): 161.7  WT(kg): 79.6  BSA: 1.89   BMI:  30.4   Last 3 Weights & Heights: Date:                           Weight/Scale Type:                    Height:   14-Jun-2022 12:56                79.6  kg                     161.7  cm  15-Cae-2021 09:18                82.1  kg / standing scale                     161.7  cm      Physical Exam:      Constitutional: awake/alert/oriented x3, no distress,   Eyes: PERRL, sclera is clear.   ENMT: mucous membranes moist, no apparent injury,  no lesions seen   Head/Neck: Neck supple,   thyroid without mass or  tenderness, No JVD, trachea midline, no bruits   Respiratory/Thorax: Thorax: Left modified radical  mastectomy.  Lower rib cage area is prominent but no tenderness.  There is no distinct mass palpable.   Cardiovascular: Heart is irregular.  Grade 3/6 systolic  murmur.   Gastrointestinal: Nondistended, soft, non-tender,  no rebound tenderness or guarding, no masses palpable, no organomegaly, +BS,    Genitourinary: No CVA tenderness.   Musculoskeletal: ROM intact, no joint swelling, normal  strength   Extremities: normal extremities, no cyanosis edema,  no clubbing   Neurological: alert and oriented x3, intact senses,  motor, response and reflexes, normal strength   Breast: Left modified radical mastectomy.  Right  breast no mass or tenderness   Lymphatic: No significant lymphadenopathy   Psychological: Appropriate mood and behavior   Skin: Warm and dry, no lesions, no rashes         Lab Results:     f Range & Units 13:15  (11/13/24) 1 yr ago  (11/14/23) 1 yr ago  (9/6/23) 2 yr ago  (6/14/22) 3 yr ago  (9/14/21) 3 yr ago  (9/8/21) 3 yr ago  (9/7/21)   WBC  4.4 - 11.3 x10*3/uL 4.7 6.1 4.4 R 6.6 R 6.2 R 9.1 R 5.4 R   RBC  4.00 - 5.20 x10*6/uL 3.80 Low  3.71 Low  3.76 Low  R 3.72 Low  R 3.52 Low  R 3.48 Low  R 3.23 Low  R   Hemoglobin  12.0 - 16.0 g/dL 11.8 Low  11.4 Low  11.5 Low  11.7 Low  11.1 Low  11.2 Low  10.4 Low    Hematocrit  36.0 - 46.0 % 36.1 35.1 Low  35.7 Low  36.5 35.2 Low  34.7 Low  31.5 Low    MCV  80 - 100 fL 95 95 95 98 100 100 98   MCH  26.0 - 34.0 pg 31.1 30.7        MCHC  32.0 - 36.0 g/dL 32.7 32.5 32.2 32.1 31.5 Low  32.3 33.0   RDW  11.5 - 14.5 % 12.3 14.2 13.0 13.9 13.3 13.4 13.2   Platelets  150 - 450 x10*3/uL 266                 ·  Results            Assessment and Plan:      Assessment and Plan:   Assessment:          #1 carcinoma left breast , post left modified radical mastectomy.  Patient also received adjuvant chemotherapy including CAF, has been remission since 1988     #2 hypertension.     #3 muscle cramps.      4.  Hemorrhoid and rectal bleed        11/13/24     Clinically patient I will reevaluate after 1 year.  Patient does not want to stop her tamoxifen.     Patient history of hemorrhoids and sometimes has rectal bleed I will make arrangement for surgical evaluation patient is in agreement      Patient is slightly anemic continue monitor CBC      Follow-up after 1 year        Time spent: 30 minutes.

## 2024-11-13 NOTE — PATIENT INSTRUCTIONS
Follow up visit for history of left breast cancer diagnosed in 1988.     Return for follow up with Dr. Saucedo in 1 year.

## 2024-12-03 ENCOUNTER — TELEPHONE (OUTPATIENT)
Dept: CARDIOLOGY | Facility: CLINIC | Age: 84
End: 2024-12-03
Payer: MEDICARE

## 2024-12-03 DIAGNOSIS — Z95.2 S/P TAVR (TRANSCATHETER AORTIC VALVE REPLACEMENT): Primary | ICD-10-CM

## 2024-12-03 DIAGNOSIS — I48.19 PERSISTENT ATRIAL FIBRILLATION (MULTI): Primary | ICD-10-CM

## 2024-12-03 RX ORDER — APIXABAN 5 MG/1
5 TABLET, FILM COATED ORAL 2 TIMES DAILY
Qty: 180 TABLET | Refills: 0 | Status: SHIPPED | OUTPATIENT
Start: 2024-12-03

## 2024-12-09 ENCOUNTER — HOSPITAL ENCOUNTER (OUTPATIENT)
Dept: CARDIOLOGY | Facility: CLINIC | Age: 84
Discharge: HOME | End: 2024-12-09
Payer: MEDICARE

## 2024-12-09 ENCOUNTER — OFFICE VISIT (OUTPATIENT)
Dept: CARDIOLOGY | Facility: CLINIC | Age: 84
End: 2024-12-09
Payer: MEDICARE

## 2024-12-09 VITALS
WEIGHT: 160 LBS | DIASTOLIC BLOOD PRESSURE: 79 MMHG | HEART RATE: 73 BPM | SYSTOLIC BLOOD PRESSURE: 182 MMHG | BODY MASS INDEX: 31.41 KG/M2 | HEIGHT: 60 IN

## 2024-12-09 DIAGNOSIS — I10 ESSENTIAL HYPERTENSION: ICD-10-CM

## 2024-12-09 DIAGNOSIS — I50.32 CHRONIC DIASTOLIC HEART FAILURE: ICD-10-CM

## 2024-12-09 DIAGNOSIS — I35.0 NONRHEUMATIC AORTIC (VALVE) STENOSIS: Primary | ICD-10-CM

## 2024-12-09 DIAGNOSIS — I48.19 PERSISTENT ATRIAL FIBRILLATION (MULTI): ICD-10-CM

## 2024-12-09 DIAGNOSIS — I36.1 NONRHEUMATIC TRICUSPID VALVE REGURGITATION: ICD-10-CM

## 2024-12-09 DIAGNOSIS — I48.91 UNSPECIFIED ATRIAL FIBRILLATION (MULTI): ICD-10-CM

## 2024-12-09 DIAGNOSIS — Z95.2 S/P TAVR (TRANSCATHETER AORTIC VALVE REPLACEMENT): ICD-10-CM

## 2024-12-09 DIAGNOSIS — R60.0 LOCALIZED EDEMA: ICD-10-CM

## 2024-12-09 DIAGNOSIS — I27.20 PULMONARY HYPERTENSION (MULTI): ICD-10-CM

## 2024-12-09 LAB
AORTIC VALVE MEAN GRADIENT: 12 MMHG
AORTIC VALVE PEAK VELOCITY: 2.55 M/S
AV PEAK GRADIENT: 26 MMHG
EJECTION FRACTION APICAL 4 CHAMBER: 66.4
EJECTION FRACTION: 63 %
LEFT ATRIUM VOLUME AREA LENGTH INDEX BSA: 43.6 ML/M2
LEFT VENTRICLE INTERNAL DIMENSION DIASTOLE: 4.43 CM (ref 3.5–6)
RIGHT VENTRICLE FREE WALL PEAK S': 11 CM/S
RIGHT VENTRICLE PEAK SYSTOLIC PRESSURE: 63.2 MMHG
TRICUSPID ANNULAR PLANE SYSTOLIC EXCURSION: 1.8 CM

## 2024-12-09 PROCEDURE — 99214 OFFICE O/P EST MOD 30 MIN: CPT | Performed by: NURSE PRACTITIONER

## 2024-12-09 PROCEDURE — 93306 TTE W/DOPPLER COMPLETE: CPT | Performed by: INTERNAL MEDICINE

## 2024-12-09 PROCEDURE — 3077F SYST BP >= 140 MM HG: CPT | Performed by: NURSE PRACTITIONER

## 2024-12-09 PROCEDURE — 1159F MED LIST DOCD IN RCRD: CPT | Performed by: NURSE PRACTITIONER

## 2024-12-09 PROCEDURE — 99204 OFFICE O/P NEW MOD 45 MIN: CPT | Performed by: NURSE PRACTITIONER

## 2024-12-09 PROCEDURE — 1160F RVW MEDS BY RX/DR IN RCRD: CPT | Performed by: NURSE PRACTITIONER

## 2024-12-09 PROCEDURE — 3078F DIAST BP <80 MM HG: CPT | Performed by: NURSE PRACTITIONER

## 2024-12-09 PROCEDURE — 1036F TOBACCO NON-USER: CPT | Performed by: NURSE PRACTITIONER

## 2024-12-09 PROCEDURE — 93306 TTE W/DOPPLER COMPLETE: CPT

## 2024-12-09 RX ORDER — FUROSEMIDE 20 MG/1
20 TABLET ORAL DAILY
Qty: 90 TABLET | Refills: 3 | Status: SHIPPED | OUTPATIENT
Start: 2024-12-09 | End: 2025-12-09

## 2024-12-09 RX ORDER — TRIAMTERENE/HYDROCHLOROTHIAZID 37.5-25 MG
1 TABLET ORAL DAILY
Qty: 90 TABLET | Refills: 3 | Status: SHIPPED | OUTPATIENT
Start: 2024-12-09

## 2024-12-09 NOTE — PROGRESS NOTES
Chief Complaint:   TAVR     History Of Present Illness:    Aicha Ochoa is a 84 y.o. female here with Aortic valve disease s/p transcatheter aortic valve replacement (TAVR).  The patient has been well since their last appointment and has no cardiac complaints. The patient denies chest pain, shortness of breath, syncope.The patient is compliant with aspirin and antibiotic prophylaxis to prevent endocarditis.  Their most recent echocardiogram demonstrates normal prosthetic valve function without significant abhishek-prosthetic regurgitation.    Last saw patient September 2023 where she was noted to be overloaded. Advised 1 week follow up.    She is here today with chronic LE edema. Taking 20mg of lasix a day. Reports edema is reasonably well controlled. She notes she did not feel well with 40mg of lasix a day. She denies SOB.     Cath (Mild CAD) - 7/16/2021  TAVR (Evolute Pro) - 9/7/2021  Holter (% AVG HR 62) - 10/30/2019  Past Medical History:  She has a past medical history of Essential (primary) hypertension (10/07/2021), Nonrheumatic aortic (valve) stenosis (08/04/2021), Other persistent atrial fibrillation (Multi) (06/10/2021), and Other secondary pulmonary hypertension (Multi).    Past Surgical History:  She has a past surgical history that includes Other surgical history (07/19/2021); Other surgical history (07/19/2021); Other surgical history (07/19/2021); Other surgical history (07/19/2021); and Other surgical history (06/27/2022).      Social History:  She reports that she has never smoked. She has never used smokeless tobacco. No history on file for alcohol use and drug use.    Family History:  No family history on file.     Allergies:  Ciprofloxacin, Clarithromycin, Codeine, and Meperidine    Review of Systems  All pertinent systems have been reviewed and are negative except for what is stated in the history of present illness.    All other systems have been reviewed and are negative and noncontributory  "to this patient's current ailments.     Visit Vitals  BP (!) 182/79 (BP Location: Right arm)   Pulse 73   Ht 1.524 m (5')   Wt 72.6 kg (160 lb)   BMI 31.25 kg/m²   Smoking Status Never   BSA 1.75 m²       Last Labs:  CBC -  Lab Results   Component Value Date    WBC 4.7 11/13/2024    HGB 11.8 (L) 11/13/2024    HCT 36.1 11/13/2024    MCV 95 11/13/2024     11/13/2024       CMP -  Lab Results   Component Value Date    CALCIUM 9.0 11/14/2023    PHOS 3.2 09/08/2021    PROT 7.0 11/14/2023    ALBUMIN 4.1 11/14/2023    AST 21 11/14/2023    ALT 12 11/14/2023    ALKPHOS 41 11/14/2023    BILITOT 0.5 11/14/2023    BUN 27 (H) 11/14/2023    CREATININE 0.98 11/14/2023       LIPID PANEL -   Lab Results   Component Value Date    CHOL 158 09/06/2023    TRIG 48 09/06/2023    HDL 90.2 09/06/2023    CHHDL 1.8 09/06/2023    LDLF 58 09/06/2023    VLDL 10 09/06/2023       RENAL FUNCTION PANEL -   Lab Results   Component Value Date    GLUCOSE 92 11/14/2023     11/14/2023    K 3.9 11/14/2023     11/14/2023    CO2 30 11/14/2023    ANIONGAP 11 11/14/2023    BUN 27 (H) 11/14/2023    CREATININE 0.98 11/14/2023    CALCIUM 9.0 11/14/2023    PHOS 3.2 09/08/2021    ALBUMIN 4.1 11/14/2023        No results found for: \"BNP\", \"HGBA1C\"      Objective   Vitals reviewed.   Constitutional:       Appearance: Healthy appearance. Not in distress.   Eyes:      Conjunctiva/sclera: Conjunctivae normal.   Neck:      Vascular: No JVR. JVD normal.   Pulmonary:      Effort: Pulmonary effort is normal.      Breath sounds: Normal breath sounds. No wheezing. No rhonchi. No rales.   Chest:      Chest wall: Not tender to palpatation.   Cardiovascular:      PMI at left midclavicular line. Normal rate. Irregular rhythm. Normal S1. Normal S2.       Murmurs: There is a grade 2/6 systolic murmur.      No gallop.  No click. No rub.   Edema:     Peripheral edema present.     Pretibial: bilateral 2+ edema of the pretibial area.     Ankle: bilateral 2+ edema of " the ankle.     Feet: bilateral 2+ edema of the feet.  Abdominal:      General: Bowel sounds are normal.      Palpations: Abdomen is soft.      Tenderness: There is no abdominal tenderness.   Musculoskeletal: Normal range of motion.         General: No tenderness. Skin:     General: Skin is warm and dry.   Neurological:      General: No focal deficit present.      Mental Status: Alert and oriented to person, place and time.       Assessment/Plan   Diagnoses and all orders for this visit:  Nonrheumatic aortic (valve) stenosis  - s/p TAVR  - denies SOB, syncope   S/P TAVR (transcatheter aortic valve replacement)  - stable, annual TTE  - abx for dental work   Chronic Diastolic Heart Failure  - Edema stable, denies SOB  - monitor for worsening edema and Sob. If occurs will discontinue  maxzide in favor of entresto. Since she is stable she does not want to change meds  - has been stable on 20mg po lasix per day   Persistent atrial fibrillation (Multi)  - rate controlled  - continue Eliquis (does not qualify for lower dose)  - asymptomatic   Essential hypertension  - white coat htn  - home systolics 120-132  - continue maxide   Localized edema  - stable  - reasonably well controlled on 20mg of lasix  - did not tolerate 40mg  - did not tolerate metolazone   Pulmonary hypertension (Multi)  - pressures elevated from last year  - denies SOB  - for now annual TTE with no changes in meds   Nonrheumatic tricuspid valve regurgitation  - Moderate to severe TR per TTE this year     Follow up 1 year with echo unless develop aforementioned symptoms.    Outpatient Medications:  Current Outpatient Medications   Medication Instructions    albuterol (ProAir HFA) 90 mcg/actuation inhaler Inhale.    alpha tocopherol (Vitamin E) 268 mg (400 unit) capsule     apixaban (ELIQUIS) 5 mg, oral, 2 times daily    ascorbic acid, vitamin C, 500 mg capsule 500 capsules, Daily    cholecalciferol (Vitamin D-3) 5,000 Units tablet Take by mouth.     cyanocobalamin (Vitamin B-12) 500 mcg tablet Take by mouth.    furosemide (LASIX) 20 mg, oral, Daily    glucosamine HCl 500 mg tablet 1 tablet, Daily    latanoprost (Xalatan) 0.005 % ophthalmic solution Administer into affected eye(s).    omega 3-dha-epa-fish oil (Fish OiL) 1,000 mg (120 mg-180 mg) capsule Take by mouth.    potassium chloride CR 20 mEq ER tablet     quiNINE (QUALAQUIN) 324 mg, Nightly    tamoxifen (Nolvadex) 20 mg tablet Take 1 tablet p.o. daily    triamterene-hydrochlorothiazid (Maxzide-25) 37.5-25 mg tablet 1 tablet, oral, Daily    vitamin E 180 mg (400 unit) capsule Take by mouth.         Exclusive of any other services or procedures performed, I, Judith MONCADA, spent 30 minutes in duration for this visit today.  This time consisted of chart review, obtaining history, and/or performing the exam as documented above, as well as, documenting the clinical information for the encounter in the electronic record, discussing treatment options, plans, and/or goals with patient, family, and/or caregiver, refilling medications, updating the electronic record, ordering medicines, lab work, imaging, referrals, and/or procedures as documented above and communicating with other [facility] professionals. I have discussed the results of laboratory, radiology, and cardiology studies with the patient and their family/caregiver.         I reviewed PCP notes, labs

## 2024-12-09 NOTE — PATIENT INSTRUCTIONS
If getting more SOB than normal let me know  If swelling in legs worse than normal and/or legs are getting painful let me know

## 2024-12-16 ENCOUNTER — OFFICE VISIT (OUTPATIENT)
Dept: PRIMARY CARE | Facility: CLINIC | Age: 84
End: 2024-12-16
Payer: MEDICARE

## 2024-12-16 VITALS
HEART RATE: 77 BPM | RESPIRATION RATE: 18 BRPM | WEIGHT: 160 LBS | BODY MASS INDEX: 31.41 KG/M2 | OXYGEN SATURATION: 98 % | DIASTOLIC BLOOD PRESSURE: 70 MMHG | HEIGHT: 60 IN | SYSTOLIC BLOOD PRESSURE: 130 MMHG | TEMPERATURE: 97.4 F

## 2024-12-16 DIAGNOSIS — J06.9 UPPER RESPIRATORY TRACT INFECTION, UNSPECIFIED TYPE: Primary | ICD-10-CM

## 2024-12-16 PROCEDURE — 3075F SYST BP GE 130 - 139MM HG: CPT | Performed by: INTERNAL MEDICINE

## 2024-12-16 PROCEDURE — 1036F TOBACCO NON-USER: CPT | Performed by: INTERNAL MEDICINE

## 2024-12-16 PROCEDURE — 99213 OFFICE O/P EST LOW 20 MIN: CPT | Performed by: INTERNAL MEDICINE

## 2024-12-16 PROCEDURE — 3078F DIAST BP <80 MM HG: CPT | Performed by: INTERNAL MEDICINE

## 2024-12-16 PROCEDURE — 1159F MED LIST DOCD IN RCRD: CPT | Performed by: INTERNAL MEDICINE

## 2024-12-16 RX ORDER — DOXYCYCLINE 100 MG/1
100 CAPSULE ORAL 2 TIMES DAILY
Qty: 14 CAPSULE | Refills: 0 | Status: SHIPPED | OUTPATIENT
Start: 2024-12-16 | End: 2024-12-23

## 2024-12-16 NOTE — PROGRESS NOTES
Subjective   Aicha Ochoa is a 84 y.o. female who presents for evaluation of symptoms of a URI. Symptoms include congestion, nasal congestion, no  fever, and non productive cough. Onset of symptoms was 2 weeks ago and has been unchanged since that time. Treatment to date: antihistamines and decongestants. She took mucinex.  She has chest congestion and white yellow phlem. She had Covid booster and flushot  Objective        12/16/2024     11:49 AM      /70   BP Location Right arm   Patient Position Sitting   BP Cuff Size Adult   Heart Rate 77   Temp 36.3 °C (97.4 °F)   Temp Source Temporal   Weight 72.6 kg (160 lb)   Height 1.524 m (5')   Resp 18   SpO2 98 %         Tobacco     Smoking status: Never   Smokeless status: Never   Reviewed: 12/16/2024   Other Vitals     BMI: 31.25 kg/m2   BSA: 1.75 m2          Physical Exam  Constitutional:       Appearance: Normal appearance.   HENT:      Nose: Congestion present.      Mouth/Throat:      Mouth: Mucous membranes are moist.      Pharynx: Oropharynx is clear. No oropharyngeal exudate or posterior oropharyngeal erythema.   Eyes:      Conjunctiva/sclera: Conjunctivae normal.   Cardiovascular:      Rate and Rhythm: Regular rhythm.      Heart sounds: Normal heart sounds.   Pulmonary:      Effort: Pulmonary effort is normal.      Breath sounds: Normal breath sounds. No wheezing.   Neurological:      Mental Status: She is alert.                Assessment/Plan   bronchitis and viral upper respiratory illness.    Discussed diagnosis and treatment of URI.  Suggested symptomatic OTC remedies.  Nasal saline spray for congestion.  Follow up as needed.  Decongestants, Mucinex, vit C , analgesics  Doxy 100 mg po bid # 14    Contact precautions

## 2025-03-05 DIAGNOSIS — I48.19 PERSISTENT ATRIAL FIBRILLATION (MULTI): ICD-10-CM

## 2025-05-08 ENCOUNTER — TELEPHONE (OUTPATIENT)
Dept: HEMATOLOGY/ONCOLOGY | Facility: CLINIC | Age: 85
End: 2025-05-08
Payer: MEDICARE

## 2025-05-08 DIAGNOSIS — C50.912 MALIGNANT NEOPLASM OF LEFT FEMALE BREAST, UNSPECIFIED ESTROGEN RECEPTOR STATUS, UNSPECIFIED SITE OF BREAST: ICD-10-CM

## 2025-05-08 NOTE — TELEPHONE ENCOUNTER
LVM for pt explaining attempted to fax orders x 2 and received failure. Requested call back with correct fax number.

## 2025-05-08 NOTE — TELEPHONE ENCOUNTER
Iraida needs a prescription for 1 prosthesis and 4-6 bras.  She sates Dr. Saucedo wrote it wrong and she has an appointment today and needs to keep that appointment because her prosthesis that she has is leaking and she needs to get a new one.      Please Fax ASAP [phone]

## 2025-05-09 NOTE — TELEPHONE ENCOUNTER
Obtained correct fax number for [employer] in [place] and faxed orders as requested. Received confirmation of successful fax.

## 2025-07-10 ENCOUNTER — TELEPHONE (OUTPATIENT)
Dept: HEMATOLOGY/ONCOLOGY | Facility: CLINIC | Age: 85
End: 2025-07-10
Payer: MEDICARE

## 2025-07-10 DIAGNOSIS — C50.912 MALIGNANT NEOPLASM OF LEFT BREAST IN FEMALE, ESTROGEN RECEPTOR POSITIVE, UNSPECIFIED SITE OF BREAST: ICD-10-CM

## 2025-07-10 DIAGNOSIS — Z17.0 MALIGNANT NEOPLASM OF LEFT BREAST IN FEMALE, ESTROGEN RECEPTOR POSITIVE, UNSPECIFIED SITE OF BREAST: ICD-10-CM

## 2025-07-10 RX ORDER — TAMOXIFEN CITRATE 20 MG/1
TABLET ORAL
Qty: 90 TABLET | Refills: 3 | Status: SHIPPED | OUTPATIENT
Start: 2025-07-10

## 2025-07-14 DIAGNOSIS — J06.9 UPPER RESPIRATORY TRACT INFECTION, UNSPECIFIED TYPE: ICD-10-CM

## 2025-07-14 DIAGNOSIS — R06.00 DYSPNEA, UNSPECIFIED TYPE: ICD-10-CM

## 2025-07-14 RX ORDER — ALBUTEROL SULFATE 90 UG/1
2 INHALANT RESPIRATORY (INHALATION) EVERY 6 HOURS PRN
Qty: 6.7 G | Refills: 3 | OUTPATIENT
Start: 2025-07-14